# Patient Record
Sex: MALE | Race: BLACK OR AFRICAN AMERICAN
[De-identification: names, ages, dates, MRNs, and addresses within clinical notes are randomized per-mention and may not be internally consistent; named-entity substitution may affect disease eponyms.]

---

## 2019-10-08 ENCOUNTER — HOSPITAL ENCOUNTER (EMERGENCY)
Dept: HOSPITAL 97 - ER | Age: 24
Discharge: HOME | End: 2019-10-08
Payer: COMMERCIAL

## 2019-10-08 VITALS — TEMPERATURE: 97.5 F | SYSTOLIC BLOOD PRESSURE: 115 MMHG | DIASTOLIC BLOOD PRESSURE: 73 MMHG | OXYGEN SATURATION: 99 %

## 2019-10-08 DIAGNOSIS — J30.9: Primary | ICD-10-CM

## 2019-10-08 PROCEDURE — 87804 INFLUENZA ASSAY W/OPTIC: CPT

## 2019-10-08 PROCEDURE — 99283 EMERGENCY DEPT VISIT LOW MDM: CPT

## 2019-10-08 PROCEDURE — 87070 CULTURE OTHR SPECIMN AEROBIC: CPT

## 2019-10-08 PROCEDURE — 87081 CULTURE SCREEN ONLY: CPT

## 2019-10-08 NOTE — EDPHYS
Physician Documentation                                                                           

 [facility]                                                                 

Name: Gus Bennett                                                                            

Age: 24 yrs                                                                                       

Sex: Male                                                                                         

: 1995                                                                                   

MRN: [id number]                                                                                   

Arrival Date: 10/08/2019                                                                          

Time: 17:16                                                                                       

Account#: [id number]                                                                            

Bed 13                                                                                            

Private MD:                                                                                       

ED Physician Bc Werner                                                                      

HPI:                                                                                              

10/08                                                                                             

17:29 This 24 yrs old Black Male presents to ER via Unassigned with complaints of Cough, Sore kb  

      Throat.                                                                                     

17:29 The patient or guardian reports cough, that is intermittent, described as mild, with no kb  

      sputum. Onset: The symptoms/episode began/occurred last night. Severity of symptoms: At     

      their worst the symptoms were mild, in the emergency department the symptoms are            

      unchanged. Modifying factors: The symptoms are alleviated by nothing, the symptoms are      

      aggravated by nothing. Associated signs and symptoms: Pertinent positives: sore throat.     

      The patient has not experienced similar symptoms in the past. The patient has not           

      recently seen a physician.                                                                  

17:31 Pt reports nasal congestion, cough, sore throat and sneezing since last night. Reports  kb  

      he has a history of allergies and asthma. .                                                 

                                                                                                  

Historical:                                                                                       

- Allergies:                                                                                      

17:33 No Known Allergies;                                                                     ss  

- Home Meds:                                                                                      

17:33 None [Active];                                                                          ss  

- PMHx:                                                                                           

17:33 Asthma; Hypertension;                                                                   ss  

- PSHx:                                                                                           

17:33 None;                                                                                   ss  

                                                                                                  

- Immunization history:: Adult Immunizations not up to date.                                      

- Social history:: Smoking status: Patient/guardian denies using tobacco.                         

- Ebola Screening: : Patient denies exposure to infectious person Patient denies travel           

  to an Ebola-affected area in the 21 days before illness onset.                                  

                                                                                                  

                                                                                                  

ROS:                                                                                              

17:29 Constitutional: Negative for fever, chills, and weight loss, Neck: Negative for injury, kb  

      pain, and swelling, Cardiovascular: Negative for chest pain, palpitations, and edema,       

      Abdomen/GI: Negative for abdominal pain, nausea, vomiting, diarrhea, and constipation,      

      Back: Negative for injury and pain, MS/Extremity: Negative for injury and deformity,        

      Skin: Negative for injury, rash, and discoloration, Neuro: Negative for headache,           

      weakness, numbness, tingling, and seizure.                                                  

17:29 ENT: Positive for sinus congestion, sore throat.                                            

17:29 Respiratory: Positive for cough, Negative for dyspnea on exertion, hemoptysis,              

      orthopnea, pleurisy, shortness of breath, sputum production, wheezing.                      

                                                                                                  

Exam:                                                                                             

17:29 Constitutional:  This is a well developed, well nourished patient who is awake, alert,  kb  

      and in no acute distress. Head/Face:  Normocephalic, atraumatic. ENT:  Nares patent. No     

      nasal discharge, no septal abnormalities noted.  Tympanic membranes are normal and          

      external auditory canals are clear.  Oropharynx with no redness, swelling, or masses,       

      exudates, or evidence of obstruction, uvula midline.  Mucous membranes moist. Neck:         

      Trachea midline, no thyromegaly or masses palpated, and no cervical lymphadenopathy.        

      Supple, full range of motion without nuchal rigidity, or vertebral point tenderness.        

      No Meningismus. Chest/axilla:  Normal chest wall appearance and motion.  Nontender with     

      no deformity.  No lesions are appreciated. Cardiovascular:  Regular rate and rhythm         

      with a normal S1 and S2.  No gallops, murmurs, or rubs.  Normal PMI, no JVD.  No pulse      

      deficits. Respiratory:  Lungs have equal breath sounds bilaterally, clear to                

      auscultation and percussion.  No rales, rhonchi or wheezes noted.  No increased work of     

      breathing, no retractions or nasal flaring. Abdomen/GI:  Soft, non-tender, with normal      

      bowel sounds.  No distension or tympany.  No guarding or rebound.  No evidence of           

      tenderness throughout. Skin:  Warm, dry with normal turgor.  Normal color with no           

      rashes, no lesions, and no evidence of cellulitis. MS/ Extremity:  Pulses equal, no         

      cyanosis.  Neurovascular intact.  Full, normal range of motion. Neuro:  Awake and           

      alert, GCS 15, oriented to person, place, time, and situation.  Cranial nerves II-XII       

      grossly intact.  Motor strength 5/5 in all extremities.  Sensory grossly intact.            

      Cerebellar exam normal.  Normal gait.                                                       

                                                                                                  

Vital Signs:                                                                                      

17:33  / 83; Pulse 74; Resp 16; Temp 97.2(TE); Pulse Ox 100% on R/A; Weight 154.22 kg;  ss  

      Height 5 ft. 9 in. (175.26 cm); Pain 10/10;                                                 

19:00  / 73; Pulse 87; Resp 16; Temp 97.5; Pulse Ox 99% ;                               bp  

17:33 Body Mass Index 50.21 (154.22 kg, 175.26 cm)                                              

                                                                                                  

MDM:                                                                                              

17:25 Patient medically screened.                                                             kb  

17:29 Data reviewed: vital signs, nurses notes. Data interpreted: Pulse oximetry: on room air kb  

      is 100 %. Interpretation: normal.                                                           

18:15 Counseling: I had a detailed discussion with the patient and/or guardian regarding: the kb  

      historical points, exam findings, and any diagnostic results supporting the                 

      discharge/admit diagnosis, lab results, the need for outpatient follow up, a family         

      practitioner, to return to the emergency department if symptoms worsen or persist or if     

      there are any questions or concerns that arise at home.                                     

                                                                                                  

10/08                                                                                             

17:25 Order name: Flu; Complete Time: 18:15                                                   kb  

10/08                                                                                             

17:25 Order name: Strep; Complete Time: 18:08                                                 kb  

10/08                                                                                             

18:08 Order name: Throat Culture                                                              EDMS

                                                                                                  

Administered Medications:                                                                         

No medications were administered                                                                  

                                                                                                  

                                                                                                  

Disposition:                                                                                      

10/09                                                                                             

05:49 I agree with the assessment and plan of care.                                           [facility] 

                                                                                                  

Disposition:                                                                                      

10/08/19 18:15 Discharged to Home. Impression: Allergic rhinitis, unspecified.                    

- Condition is Stable.                                                                            

- Discharge Instructions: Cough, Adult, Easy-to-Read, Nasal Allergies, Easy-to-Read,              

  Allergies, Easy-to-Read.                                                                        

                                                                                                  

- Medication Reconciliation Form, Thank You Letter, Antibiotic Education, Prescription            

  Opioid Use form.                                                                                

- Follow up: Emergency Department; When: As needed; Reason: Worsening of condition.               

  Follow up: Private Physician; When: 2 - 3 days; Reason: Recheck today's complaints,             

  Continuance of care, Re-evaluation by your physician.                                           

                                                                                                  

                                                                                                  

                                                                                                  

Signatures:                                                                                       

Dispatcher [employer]                           EDMS                                                 

Nichelle Estrada, FNP-C                 FNP-Ulicesb                                                   

Bc Werner MD MD cha Smirch, Shelby, RN RN ss Peltier, Brian, RN                      RN   bp                                                   

                                                                                                  

Corrections: (The following items were deleted from the chart)                                    

10/08                                                                                             

19:02 18:15 10/08/2019 18:15 Discharged to Home. Impression: Allergic rhinitis, unspecified.  bp  

      Condition is Stable. Forms are Medication Reconciliation Form, Thank You Letter,            

      Antibiotic Education, Prescription Opioid Use. Follow up: Emergency Department; When:       

      As needed; Reason: Worsening of condition. Follow up: Private Physician; When: 2 - 3        

      days; Reason: Recheck today's complaints, Continuance of care, Re-evaluation by your        

      physician. kb                                                                               

                                                                                                  

**************************************************************************************************

## 2019-10-08 NOTE — ER
Nurse's Notes                                                                                     

 [facility]                                                                 

Name: Gus Bennett                                                                            

Age: 24 yrs                                                                                       

Sex: Male                                                                                         

: 1995                                                                                   

MRN: [id number]                                                                                   

Arrival Date: 10/08/2019                                                                          

Time: 17:16                                                                                       

Account#: [id number]                                                                            

Bed 13                                                                                            

Private MD:                                                                                       

Diagnosis: Allergic rhinitis, unspecified                                                         

                                                                                                  

Presentation:                                                                                     

10/08                                                                                             

17:32 Presenting complaint: Patient states: "I got a cold." Pt reports cough, sneezing, nasal ss  

      congestion and a sore throat when coughing that began last night. Transition of care:       

      patient was not received from another setting of care. Onset of symptoms was 2019. Risk Assessment: Do you want to hurt yourself or someone else? Patient            

      reports no desire to harm self or others. Initial Sepsis Screen: Does the patient meet      

      any 2 criteria? No. Patient's initial sepsis screen is negative. Does the patient have      

      a suspected source of infection? No. Patient's initial sepsis screen is negative. Care      

      prior to arrival: None.                                                                     

17:32 Method Of Arrival: Ambulatory                                                           ss  

17:32 Acuity: RACHEL 4                                                                           ss  

                                                                                                  

Triage Assessment:                                                                                

17:30 General: Appears in no apparent distress. comfortable, Behavior is calm, cooperative,   bp  

      appropriate for age. Pain: Complains of pain in THROAT. EENT: Reports pain when             

      swallowing. Neuro: No deficits noted. Cardiovascular: No deficits noted. Respiratory:       

      No deficits noted. GI: No signs and/or symptoms were reported involving the                 

      gastrointestinal system. : No signs and/or symptoms were reported regarding the           

      genitourinary system. Derm: No deficits noted. Musculoskeletal: No deficits noted.          

                                                                                                  

Historical:                                                                                       

- Allergies:                                                                                      

17:33 No Known Allergies;                                                                     ss  

- Home Meds:                                                                                      

17:33 None [Active];                                                                          ss  

- PMHx:                                                                                           

17:33 Asthma; Hypertension;                                                                   ss  

- PSHx:                                                                                           

17:33 None;                                                                                   ss  

                                                                                                  

- Immunization history:: Adult Immunizations not up to date.                                      

- Social history:: Smoking status: Patient/guardian denies using tobacco.                         

- Ebola Screening: : Patient denies exposure to infectious person Patient denies travel           

  to an Ebola-affected area in the 21 days before illness onset.                                  

                                                                                                  

                                                                                                  

Screenin:23 Abuse screen: Denies threats or abuse. Denies injuries from another. Nutritional        bp  

      screening: No deficits noted. Tuberculosis screening: No symptoms or risk factors           

      identified. Fall Risk None identified.                                                      

                                                                                                  

Assessment:                                                                                       

18:22 General: SEE TRIAGE NOTE. Respiratory: Airway is patent Respiratory effort is even,     bp  

      unlabored, Breath sounds are clear bilaterally. EENT: Throat is clear.                      

19:01 Reassessment: PT D/C HOME AMBULATORY, DX WITH ALLERGIC RHINITIS.                        bp  

                                                                                                  

Vital Signs:                                                                                      

17:33  / 83; Pulse 74; Resp 16; Temp 97.2(TE); Pulse Ox 100% on R/A; Weight 154.22 kg;    

      Height 5 ft. 9 in. (175.26 cm); Pain 10/10;                                                 

19:00  / 73; Pulse 87; Resp 16; Temp 97.5; Pulse Ox 99% ;                               bp  

17:33 Body Mass Index 50.21 (154.22 kg, 175.26 cm)                                              

                                                                                                  

ED Course:                                                                                        

17:16 Patient arrived in ED.                                                                  mr  

17:22 [facility]                                                        kb  

17:22 Bc Werner MD is Attending Physician.                                             kb  

17:24 Bear Chowdhury, RN is Primary Nurse.                                                    bp  

17:33 Triage completed.                                                                         

17:33 Arm band placed on right wrist.                                                           

17:42 Flu and/or RSV swab sent to lab. Strep swab sent to lab.                                [facility] 

17:42 Strep Sent.                                                                             [facility] 

17:42 Flu Sent.                                                                               [facility] 

18:23 Patient has correct armband on for positive identification. Bed in low position. Call   bp  

      light in reach. Side rails up X2.                                                           

19:01 No provider procedures requiring assistance completed. Patient did not have IV access   bp  

      during this emergency room visit.                                                           

                                                                                                  

Administered Medications:                                                                         

No medications were administered                                                                  

                                                                                                  

                                                                                                  

Outcome:                                                                                          

18:15 Discharge ordered by MD.                                                                kb  

19:01 Discharged to home ambulatory, with family.                                             bp  

19:01 Condition: stable                                                                           

19:01 Discharge instructions given to patient, Instructed on discharge instructions, follow       

      up and referral plans. Demonstrated understanding of instructions, follow-up care.          

19:02 Patient left the ED.                                                                    bp  

                                                                                                  

Signatures:                                                                                       

Nichelle Estrada FNP-C FNP-Pilar                                                   

Cheri Sung                                                   

Nelda Jimenez, ANNALEE                      RN                                                      

Doris Archer                              [facility]                                                  

Bear Chowdhury, ANNALEE                      RN   bp                                                   

                                                                                                  

**************************************************************************************************